# Patient Record
Sex: FEMALE | Race: WHITE | Employment: OTHER | URBAN - METROPOLITAN AREA
[De-identification: names, ages, dates, MRNs, and addresses within clinical notes are randomized per-mention and may not be internally consistent; named-entity substitution may affect disease eponyms.]

---

## 2022-11-16 ENCOUNTER — HOSPITAL ENCOUNTER (EMERGENCY)
Age: 27
Discharge: HOME | End: 2022-11-16
Attending: EMERGENCY MEDICINE
Payer: COMMERCIAL

## 2022-11-16 ENCOUNTER — APPOINTMENT (OUTPATIENT)
Dept: GENERAL RADIOLOGY | Age: 27
End: 2022-11-16
Attending: PHYSICIAN ASSISTANT
Payer: COMMERCIAL

## 2022-11-16 VITALS
WEIGHT: 171.52 LBS | HEIGHT: 68 IN | TEMPERATURE: 98.1 F | OXYGEN SATURATION: 100 % | BODY MASS INDEX: 25.99 KG/M2 | DIASTOLIC BLOOD PRESSURE: 82 MMHG | HEART RATE: 84 BPM | SYSTOLIC BLOOD PRESSURE: 113 MMHG | RESPIRATION RATE: 16 BRPM

## 2022-11-16 DIAGNOSIS — R07.89 ATYPICAL CHEST PAIN: Primary | ICD-10-CM

## 2022-11-16 DIAGNOSIS — R11.0 NAUSEA WITHOUT VOMITING: ICD-10-CM

## 2022-11-16 LAB
ALBUMIN SERPL-MCNC: 4.1 G/DL (ref 3.5–5)
ALBUMIN/GLOB SERPL: 1.1 {RATIO} (ref 1.1–2.2)
ALP SERPL-CCNC: 81 U/L (ref 45–117)
ALT SERPL-CCNC: 28 U/L (ref 12–78)
ANION GAP SERPL CALC-SCNC: 6 MMOL/L (ref 5–15)
APPEARANCE UR: ABNORMAL
AST SERPL-CCNC: 13 U/L (ref 15–37)
ATRIAL RATE: 82 BPM
BASOPHILS # BLD: 0 K/UL (ref 0–0.1)
BASOPHILS NFR BLD: 1 % (ref 0–1)
BILIRUB SERPL-MCNC: 0.3 MG/DL (ref 0.2–1)
BILIRUB UR QL: NEGATIVE
BUN SERPL-MCNC: 12 MG/DL (ref 6–20)
BUN/CREAT SERPL: 19 (ref 12–20)
CALCIUM SERPL-MCNC: 9.6 MG/DL (ref 8.5–10.1)
CALCULATED P AXIS, ECG09: 20 DEGREES
CALCULATED R AXIS, ECG10: 67 DEGREES
CALCULATED T AXIS, ECG11: 66 DEGREES
CHLORIDE SERPL-SCNC: 105 MMOL/L (ref 97–108)
CO2 SERPL-SCNC: 28 MMOL/L (ref 21–32)
COLOR UR: ABNORMAL
CREAT SERPL-MCNC: 0.63 MG/DL (ref 0.55–1.02)
DIAGNOSIS, 93000: NORMAL
DIFFERENTIAL METHOD BLD: ABNORMAL
EOSINOPHIL # BLD: 0 K/UL (ref 0–0.4)
EOSINOPHIL NFR BLD: 1 % (ref 0–7)
ERYTHROCYTE [DISTWIDTH] IN BLOOD BY AUTOMATED COUNT: 12.1 % (ref 11.5–14.5)
GLOBULIN SER CALC-MCNC: 3.7 G/DL (ref 2–4)
GLUCOSE SERPL-MCNC: 81 MG/DL (ref 65–100)
GLUCOSE UR STRIP.AUTO-MCNC: NEGATIVE MG/DL
HCG UR QL: NEGATIVE
HCT VFR BLD AUTO: 41.9 % (ref 35–47)
HGB BLD-MCNC: 14.1 G/DL (ref 11.5–16)
HGB UR QL STRIP: NEGATIVE
IMM GRANULOCYTES # BLD AUTO: 0 K/UL (ref 0–0.04)
IMM GRANULOCYTES NFR BLD AUTO: 0 % (ref 0–0.5)
KETONES UR QL STRIP.AUTO: NEGATIVE MG/DL
LEUKOCYTE ESTERASE UR QL STRIP.AUTO: NEGATIVE
LYMPHOCYTES # BLD: 1.4 K/UL (ref 0.8–3.5)
LYMPHOCYTES NFR BLD: 41 % (ref 12–49)
MCH RBC QN AUTO: 30.3 PG (ref 26–34)
MCHC RBC AUTO-ENTMCNC: 33.7 G/DL (ref 30–36.5)
MCV RBC AUTO: 89.9 FL (ref 80–99)
MONOCYTES # BLD: 0.2 K/UL (ref 0–1)
MONOCYTES NFR BLD: 5 % (ref 5–13)
NEUTS SEG # BLD: 1.8 K/UL (ref 1.8–8)
NEUTS SEG NFR BLD: 52 % (ref 32–75)
NITRITE UR QL STRIP.AUTO: NEGATIVE
NRBC # BLD: 0 K/UL (ref 0–0.01)
NRBC BLD-RTO: 0 PER 100 WBC
P-R INTERVAL, ECG05: 120 MS
PH UR STRIP: 6 [PH] (ref 5–8)
PLATELET # BLD AUTO: 187 K/UL (ref 150–400)
PMV BLD AUTO: 10 FL (ref 8.9–12.9)
POTASSIUM SERPL-SCNC: 4.3 MMOL/L (ref 3.5–5.1)
PROT SERPL-MCNC: 7.8 G/DL (ref 6.4–8.2)
PROT UR STRIP-MCNC: NEGATIVE MG/DL
Q-T INTERVAL, ECG07: 364 MS
QRS DURATION, ECG06: 82 MS
QTC CALCULATION (BEZET), ECG08: 425 MS
RBC # BLD AUTO: 4.66 M/UL (ref 3.8–5.2)
SODIUM SERPL-SCNC: 139 MMOL/L (ref 136–145)
SP GR UR REFRACTOMETRY: 1.01
TROPONIN-HIGH SENSITIVITY: <4 NG/L (ref 0–51)
UROBILINOGEN UR QL STRIP.AUTO: 0.2 EU/DL (ref 0.2–1)
VENTRICULAR RATE, ECG03: 82 BPM
WBC # BLD AUTO: 3.5 K/UL (ref 3.6–11)

## 2022-11-16 PROCEDURE — 80053 COMPREHEN METABOLIC PANEL: CPT

## 2022-11-16 PROCEDURE — 96374 THER/PROPH/DIAG INJ IV PUSH: CPT

## 2022-11-16 PROCEDURE — 81025 URINE PREGNANCY TEST: CPT

## 2022-11-16 PROCEDURE — 71046 X-RAY EXAM CHEST 2 VIEWS: CPT

## 2022-11-16 PROCEDURE — 36415 COLL VENOUS BLD VENIPUNCTURE: CPT

## 2022-11-16 PROCEDURE — 99285 EMERGENCY DEPT VISIT HI MDM: CPT

## 2022-11-16 PROCEDURE — 81003 URINALYSIS AUTO W/O SCOPE: CPT

## 2022-11-16 PROCEDURE — 93005 ELECTROCARDIOGRAM TRACING: CPT

## 2022-11-16 PROCEDURE — 74011250637 HC RX REV CODE- 250/637: Performed by: PHYSICIAN ASSISTANT

## 2022-11-16 PROCEDURE — 84484 ASSAY OF TROPONIN QUANT: CPT

## 2022-11-16 PROCEDURE — 74011250636 HC RX REV CODE- 250/636: Performed by: PHYSICIAN ASSISTANT

## 2022-11-16 PROCEDURE — 85025 COMPLETE CBC W/AUTO DIFF WBC: CPT

## 2022-11-16 PROCEDURE — 74011000250 HC RX REV CODE- 250: Performed by: PHYSICIAN ASSISTANT

## 2022-11-16 RX ORDER — NAPROXEN 375 MG/1
375 TABLET ORAL 2 TIMES DAILY WITH MEALS
Qty: 14 TABLET | Refills: 0 | Status: SHIPPED | OUTPATIENT
Start: 2022-11-16 | End: 2022-11-23

## 2022-11-16 RX ORDER — KETOROLAC TROMETHAMINE 30 MG/ML
30 INJECTION, SOLUTION INTRAMUSCULAR; INTRAVENOUS
Status: COMPLETED | OUTPATIENT
Start: 2022-11-16 | End: 2022-11-16

## 2022-11-16 RX ORDER — OMEPRAZOLE 40 MG/1
40 CAPSULE, DELAYED RELEASE ORAL DAILY
Qty: 14 CAPSULE | Refills: 0 | Status: SHIPPED | OUTPATIENT
Start: 2022-11-16 | End: 2022-11-30

## 2022-11-16 RX ORDER — ACETAMINOPHEN 325 MG/1
650 TABLET ORAL EVERY 6 HOURS
Qty: 20 TABLET | Refills: 0 | Status: SHIPPED | OUTPATIENT
Start: 2022-11-16 | End: 2022-11-21

## 2022-11-16 RX ORDER — MAG HYDROX/ALUMINUM HYD/SIMETH 200-200-20
30 SUSPENSION, ORAL (FINAL DOSE FORM) ORAL
Qty: 150 ML | Refills: 0 | Status: SHIPPED | OUTPATIENT
Start: 2022-11-16 | End: 2022-11-26

## 2022-11-16 RX ADMIN — KETOROLAC TROMETHAMINE 30 MG: 30 INJECTION, SOLUTION INTRAMUSCULAR at 09:53

## 2022-11-16 RX ADMIN — ALUMINUM HYDROXIDE AND MAGNESIUM HYDROXIDE 40 ML: 200; 200 SUSPENSION ORAL at 09:53

## 2022-11-16 NOTE — DISCHARGE INSTRUCTIONS
Follow-up with your PCP at the next available appointment for reevaluation of your symptoms. As we discussed, though we did not find any life-threatening cause of your chest pain, we are not certain of the cause. This may be caused to musculoskeletal pain for digestive issues such as reflux.   You should return to the emergency department sooner if you have any new concerning symptoms

## 2022-11-16 NOTE — ED PROVIDER NOTES
EMERGENCY DEPARTMENT HISTORY AND PHYSICAL EXAM      Date: 11/16/2022  Patient Name: Teena Kenyon    History of Presenting Illness     Chief Complaint   Patient presents with    Chest Pain     CP for over a week she says she went to an urgent care. Pt has been taking ibuprofen without relief. Pt cannot get in to see a cardiologist for over a month. History Provided By: Patient    HPI: Teena Kenyon, 32 y.o. female with no past medical history who presents the emergency department chest pain. She had a sudden onset of anterior chest pain which she describes as burning and pressure-like. It has been persistent and worsening since. It is worse at night when laying down, but otherwise no alleviating or ameliorating factors. She denies any migratory or radiating pain. She has had some nausea when eating, but otherwise no associated symptoms to include fevers, congestion, sore throat, dizziness, headaches, myalgias, cough, hemoptysis, shortness of breath, vomiting, abdominal pain, diarrhea, rash, urinary symptoms, abnormal vaginal discharge or bleeding, palpitations, lower extremities. She denies any past history of thromboembolic disease, recent surgery/immobilization/travel, estrogen use, hemoptysis, lower extremity redness/swelling/pain, active cancer. She denies any history of illicit drug use. No past family history of cardiovascular disease in the immediate family. There are no other complaints, changes, or physical findings at this time. PCP: None    No current facility-administered medications on file prior to encounter. No current outpatient medications on file prior to encounter. Past History     Past Medical History:  No past medical history on file. Past Surgical History:  No past surgical history on file. Family History:  No family history on file. Social History:        Allergies:  No Known Allergies      Review of Systems   Review of Systems   Constitutional: Negative for chills and fever. HENT:  Negative for congestion and sore throat. Respiratory:  Negative for cough and shortness of breath. Cardiovascular:  Positive for chest pain. Gastrointestinal:  Positive for nausea. Negative for abdominal pain, diarrhea and vomiting. Genitourinary:  Negative for dysuria and hematuria. Musculoskeletal:  Negative for myalgias. Skin:  Negative for rash. Neurological:  Negative for headaches. All other systems reviewed and are negative. Physical Exam   Physical Exam  Vitals and nursing note reviewed. Constitutional:       General: She is not in acute distress. Appearance: She is not toxic-appearing. HENT:      Head: Atraumatic. Right Ear: External ear normal.      Left Ear: External ear normal.      Nose: Nose normal.      Mouth/Throat:      Mouth: Mucous membranes are moist.   Eyes:      Extraocular Movements: Extraocular movements intact. Conjunctiva/sclera: Conjunctivae normal.   Cardiovascular:      Rate and Rhythm: Normal rate and regular rhythm. Pulses: Normal pulses. Heart sounds: Normal heart sounds. No murmur heard. No friction rub. No gallop. Comments: No murmurs rubs or gallops. Chest wall normal to inspection with no lesions erythema deformity or swelling. No reproducible tenderness or crepitus. Pulmonary:      Effort: Pulmonary effort is normal. No respiratory distress. Breath sounds: Normal breath sounds. No stridor. No wheezing, rhonchi or rales. Comments: Patient speaking in full sentences with no accessory muscle use, increased work of breathing ,or signs of respiratory distress. Good breath sounds to bases bilaterally. No wheezing, rhonchi, crackles or stridor. Abdominal:      General: Abdomen is flat. There is no distension. Palpations: Abdomen is soft. Tenderness: There is no abdominal tenderness. There is no guarding or rebound. Musculoskeletal:         General: No swelling. Cervical back: Neck supple. Skin:     General: Skin is warm. Neurological:      Mental Status: She is alert and oriented to person, place, and time. Psychiatric:         Mood and Affect: Mood normal.         Behavior: Behavior normal.         Thought Content: Thought content normal.         Judgment: Judgment normal.       Diagnostic Study Results     Labs -     Recent Results (from the past 12 hour(s))   EKG, 12 LEAD, INITIAL    Collection Time: 11/16/22  8:55 AM   Result Value Ref Range    Ventricular Rate 82 BPM    Atrial Rate 82 BPM    P-R Interval 120 ms    QRS Duration 82 ms    Q-T Interval 364 ms    QTC Calculation (Bezet) 425 ms    Calculated P Axis 20 degrees    Calculated R Axis 67 degrees    Calculated T Axis 66 degrees    Diagnosis       Normal sinus rhythm  Nonspecific ST abnormality  No previous ECGs available     CBC WITH AUTOMATED DIFF    Collection Time: 11/16/22  9:04 AM   Result Value Ref Range    WBC 3.5 (L) 3.6 - 11.0 K/uL    RBC 4.66 3.80 - 5.20 M/uL    HGB 14.1 11.5 - 16.0 g/dL    HCT 41.9 35.0 - 47.0 %    MCV 89.9 80.0 - 99.0 FL    MCH 30.3 26.0 - 34.0 PG    MCHC 33.7 30.0 - 36.5 g/dL    RDW 12.1 11.5 - 14.5 %    PLATELET 406 660 - 066 K/uL    MPV 10.0 8.9 - 12.9 FL    NRBC 0.0 0  WBC    ABSOLUTE NRBC 0.00 0.00 - 0.01 K/uL    NEUTROPHILS 52 32 - 75 %    LYMPHOCYTES 41 12 - 49 %    MONOCYTES 5 5 - 13 %    EOSINOPHILS 1 0 - 7 %    BASOPHILS 1 0 - 1 %    IMMATURE GRANULOCYTES 0 0.0 - 0.5 %    ABS. NEUTROPHILS 1.8 1.8 - 8.0 K/UL    ABS. LYMPHOCYTES 1.4 0.8 - 3.5 K/UL    ABS. MONOCYTES 0.2 0.0 - 1.0 K/UL    ABS. EOSINOPHILS 0.0 0.0 - 0.4 K/UL    ABS. BASOPHILS 0.0 0.0 - 0.1 K/UL    ABS. IMM.  GRANS. 0.0 0.00 - 0.04 K/UL    DF AUTOMATED     URINALYSIS W/ RFLX MICROSCOPIC    Collection Time: 11/16/22  9:04 AM   Result Value Ref Range    Color YELLOW/STRAW      Appearance CLOUDY (A) CLEAR      Specific gravity 1.010      pH (UA) 6.0 5.0 - 8.0      Protein Negative NEG mg/dL Glucose Negative NEG mg/dL    Ketone Negative NEG mg/dL    Bilirubin Negative NEG      Blood Negative NEG      Urobilinogen 0.2 0.2 - 1.0 EU/dL    Nitrites Negative NEG      Leukocyte Esterase Negative NEG     HCG URINE, QL. - POC    Collection Time: 11/16/22  9:09 AM   Result Value Ref Range    Pregnancy test,urine (POC) Negative NEG         Radiologic Studies -   XR CHEST PA LAT   Final Result      No acute abnormality. CT Results  (Last 48 hours)      None          CXR Results  (Last 48 hours)                 11/16/22 0919  XR CHEST PA LAT Final result    Impression:      No acute abnormality. Narrative:  EXAM: XR CHEST PA LAT       INDICATION: Chest pain       COMPARISON: None       TECHNIQUE: PA and lateral chest views       FINDINGS: The cardiac size is within normal limits. The pulmonary vasculature is   within normal limits. The lungs and pleural spaces are clear. Dextroconvex scoliosis of the thoracic   spine is noted. Medical Decision Making   I am the first provider for this patient. I reviewed the vital signs, available nursing notes, past medical history, past surgical history, family history and social history. Vital Signs-Reviewed the patient's vital signs. Patient Vitals for the past 12 hrs:   Temp Pulse Resp BP SpO2   11/16/22 0842 98.1 °F (36.7 °C) 84 16 113/82 100 %       Records Reviewed: Nursing Notes and Old Medical Records    Provider Notes (Medical Decision Making):   Patient evaluated for a 1 week history of persistent anterior chest pain. She is an otherwise healthy female with no risk factors for cardiopulmonary disease. EKG and troponin were unremarkable. She is a heart score of 0 = low risk for major adverse cardiovascular event in the next 30 days. Patient is PERC negative and therefore can be excluded for further evaluation for pulmonary embolism.   She has no features concerning otherwise for a life-threatening cause of chest pain, include pneumothorax, ACS, AAA, pneumonia or Boerhaave's. I do suspect the patient's symptoms are likely gastrointestinal related versus musculoskeletal.  She will be treated here acutely for both. She was advised of the diagnostic uncertainty of her evaluation and the need to follow-up with her PCP at the next available appointment. She was given return precautions to the ED. Patient expressed understanding agreement the discharge instructions and treatment plan. ED Course:   Initial assessment performed. The patients presenting problems have been discussed, and they are in agreement with the care plan formulated and outlined with them. I have encouraged them to ask questions as they arise throughout their visit. ED Course as of 11/16/22 0946   Wed Nov 16, 2022   0945 EKG interpretation: Sinus rate and rhythm 82 bpm.  No axis deviation. Intervals normal.  Nonspecific T wave changes in V1 and V2. No ischemic ST/T wave changes. [AJ]   [id number] Patient evaluated for a 1 week history of chest pain. She is PERC negative and therefore can be excluded from further evaluation for pulmonary embolism [AJ]      ED Course User Index  [AJ] BRUCE Rivero       Critical Care Time: None    Disposition:  discharged    PLAN:  1. There are no discharge medications for this patient. 2.   Follow-up Information    None       Return to ED if worse     Diagnosis     Clinical Impression: No diagnosis found. BRUCE Calvo (Electronic Signature)          Please note that this dictation was completed with [employer], the computer voice recognition software. Quite often unanticipated grammatical, syntax, homophones, and other interpretive errors are inadvertently transcribed by the computer software. Please disregards these errors. Please excuse any errors that have escaped final proofreading.

## 2022-11-16 NOTE — Clinical Note
[address]  [facility] EMERGENCY DEPT  [address] [place] [address]  Lucy Woodson [id number] [phone]    Work/School Note    Date: 11/16/2022    To Whom It May concern:      Salud Motta was seen and treated today in the emergency room by the following provider(s):  Attending Provider: Radha Finn MD  Physician Assistant: BRUCE Ramos. Salud Motta is excused from work/school on 11/16/22. She is clear to return to work/school on 11/17/22.         Sincerely,          BRUCE Hunter

## 2022-11-18 ENCOUNTER — OFFICE VISIT (OUTPATIENT)
Dept: FAMILY MEDICINE CLINIC | Age: 27
End: 2022-11-18
Payer: COMMERCIAL

## 2022-11-18 VITALS
HEIGHT: 68 IN | OXYGEN SATURATION: 100 % | TEMPERATURE: 97.4 F | WEIGHT: 170 LBS | DIASTOLIC BLOOD PRESSURE: 73 MMHG | RESPIRATION RATE: 16 BRPM | BODY MASS INDEX: 25.76 KG/M2 | HEART RATE: 76 BPM | SYSTOLIC BLOOD PRESSURE: 108 MMHG

## 2022-11-18 DIAGNOSIS — R07.9 CHEST PAIN, UNSPECIFIED TYPE: Primary | ICD-10-CM

## 2022-11-18 PROCEDURE — 99204 OFFICE O/P NEW MOD 45 MIN: CPT | Performed by: STUDENT IN AN ORGANIZED HEALTH CARE EDUCATION/TRAINING PROGRAM

## 2022-11-18 NOTE — PROGRESS NOTES
1. Have you been to the ER, urgent care clinic since your last visit? Hospitalized since your last visit? Yes When: 11/16/2022 Where: [facility] Reason for visit: Chest Pain    2. Have you seen or consulted any other health care providers outside of the [address] [place] [address] since your last visit? Include any pap smears or colon screening. No    OBGYN:  Dr. Tamara Moses MD  Phone: [phone]    Upcoming Cardiology appointment 12/12/2022    Chief Complaint   Patient presents with    New Patient           Establish Care           Chest Pain     Health Maintenance Due   Topic Date Due    Hepatitis C Screening  Never done    Depression Screen  Never done    COVID-19 Vaccine (1) Never done    DTaP/Tdap/Td series (1 - Tdap) Never done    Pap Smear  Never done    Flu Vaccine (1) 08/01/2022     3 most recent PHQ Screens 11/18/2022   Little interest or pleasure in doing things Not at all   Feeling down, depressed, irritable, or hopeless Not at all   Total Score PHQ 2 0     Abuse Screening Questionnaire 11/18/2022   Do you ever feel afraid of your partner? N   Are you in a relationship with someone who physically or mentally threatens you? N   Is it safe for you to go home?  Y   Visit Vitals  /73 (BP 1 Location: Left upper arm, BP Patient Position: Sitting, BP Cuff Size: Adult)   Pulse 76   Temp 97.4 °F (36.3 °C) (Skin)   Resp 16   Ht 5' 8\" (1.727 m)   Wt 170 lb (77.1 kg)   SpO2 100%   BMI 25.85 kg/m²

## 2022-11-18 NOTE — PROGRESS NOTES
Assessment/Plan:     Diagnoses and all orders for this visit:    1. Chest pain, unspecified type  -     REFERRAL TO CARDIOLOGY  -Chest pressure noted intermittently since June however has turned constant for the past 1.5 weeks.  -Evaluated in the ER 2 days ago with an EKG that noted normal sinus rhythm without any acute ischemic changes, a negative troponin, unremarkable lab work and a chest x-ray that showed no acute abnormality.  -Patient has been trialing NSAIDs, Tylenol and omeprazole without much benefit.  -Uncertain etiology however  want to rule out underlying cardiac etiology  -Patient referred to cardiology and has an appointment on 11/21 at 2:40 PM.  -ED precautions discussed. Follow-up and Dispositions    Return if symptoms worsen or fail to improve. Discussed expected course/resolution/complications of diagnosis in detail with patient. Medication risks/benefits/costs/interactions/alternatives discussed with patient. .   Pt expressed understanding with the diagnosis and plan    Subjective:      Beny Fong is a 32 y.o. female who presents for had concerns including New Patient (/), Establish Care (/), and Chest Pain. Current Outpatient Medications   Medication Sig Dispense Refill    omeprazole (PRILOSEC) 40 mg capsule Take 1 Capsule by mouth daily for 14 days. 14 Capsule 0    naproxen (NAPROSYN) 375 mg tablet Take 1 Tablet by mouth two (2) times daily (with meals) for 7 days. 14 Tablet 0    acetaminophen (TYLENOL) 325 mg tablet Take 2 Tablets by mouth every six (6) hours for 5 days. 20 Tablet 0    alum-mag hydroxide-simeth (MYLANTA) 200-200-20 mg/5 mL susp Take 30 mL by mouth every four (4) hours as needed for Heartburn for up to 10 days. 150 mL 0       No Known Allergies  History reviewed. No pertinent past medical history. No past surgical history on file. No family history on file.   Social History     Socioeconomic History    Marital status:      Spouse name: Not on file    Number of children: Not on file    Years of education: Not on file    Highest education level: Not on file   Occupational History    Not on file   Tobacco Use    Smoking status: Never    Smokeless tobacco: Never   Substance and Sexual Activity    Alcohol use: Not Currently    Drug use: Never    Sexual activity: Yes     Partners: Male     Comment:    Other Topics Concern    Not on file   Social History Narrative    Not on file     Social Determinants of Health     Financial Resource Strain: Not on file   Food Insecurity: Not on file   Transportation Needs: Not on file   Physical Activity: Inactive    Days of Exercise per Week: 0 days    Minutes of Exercise per Session: 0 min   Stress: Not on file   Social Connections: Not on file   Intimate Partner Violence: Not At Risk    Fear of Current or Ex-Partner: No    Emotionally Abused: No    Physically Abused: No    Sexually Abused: No   Housing Stability: Not on file       Patient is a 72-year-old female who presents the office today to establish care and with concerns with persistent chest pain. Patient states she has had intermittent chest pain since in June of this year however over the past 1.5 weeks she has noted a constant chest pain. She states the pain is a pressure sensation across her entire chest and is not changed based on position or activity. She did present to the ER on 11/16 where she had a work-up including EKG, chest x-ray, troponin, and lab work done. She also had been to [facility] prior to her ER visit. She has been taking naproxen, Tylenol and omeprazole. She has not noted any benefit at this time. She denies any heartburn type symptoms or tenderness to her chest wall on palpation. She did state that she has felt lightheaded associated with the chest pain previously but none now. She feels of the chest pain makes her anxious but states that anxiety did not start prior to the chest pain.   She denies any loss of consciousness or syncope. She does have an appointment with cardiology on 12/12 but would desire to be seen sooner. She denies any other acute symptoms or concerns today. ROS:   Review of Systems   Constitutional:  Negative for chills and fever. HENT:  Negative for congestion. Respiratory:  Negative for cough and shortness of breath. Cardiovascular:  Positive for chest pain. Negative for palpitations and leg swelling. Gastrointestinal:  Negative for abdominal pain, nausea and vomiting. Neurological:  Negative for dizziness, tingling, weakness and headaches. Objective:   Visit Vitals  /73 (BP 1 Location: Left upper arm, BP Patient Position: Sitting, BP Cuff Size: Adult)   Pulse 76   Temp 97.4 °F (36.3 °C) (Skin)   Resp 16   Ht 5' 8\" (1.727 m)   Wt 170 lb (77.1 kg)   LMP 11/12/2022   SpO2 100%   BMI 25.85 kg/m²         Vitals and Nurse Documentation reviewed. Physical Exam  Constitutional:       General: She is not in acute distress. Appearance: Normal appearance. She is not toxic-appearing. HENT:      Head: Normocephalic and atraumatic. Eyes:      Conjunctiva/sclera: Conjunctivae normal.   Cardiovascular:      Rate and Rhythm: Normal rate and regular rhythm. Pulses: Normal pulses. Heart sounds: Normal heart sounds. No murmur heard. No gallop. Comments: No chest wall tenderness to palpation  Pulmonary:      Effort: Pulmonary effort is normal. No respiratory distress. Breath sounds: Normal breath sounds. No wheezing or rhonchi. Abdominal:      General: Bowel sounds are normal. There is no distension. Palpations: Abdomen is soft. Tenderness: There is no abdominal tenderness. There is no guarding. Musculoskeletal:      Cervical back: Neck supple. Right lower leg: No edema. Left lower leg: No edema. Neurological:      Mental Status: She is alert and oriented to person, place, and time.       Gait: Gait normal.       Results for orders placed or performed during the hospital encounter of 11/16/22   CBC WITH AUTOMATED DIFF   Result Value Ref Range    WBC 3.5 (L) 3.6 - 11.0 K/uL    RBC 4.66 3.80 - 5.20 M/uL    HGB 14.1 11.5 - 16.0 g/dL    HCT 41.9 35.0 - 47.0 %    MCV 89.9 80.0 - 99.0 FL    MCH 30.3 26.0 - 34.0 PG    MCHC 33.7 30.0 - 36.5 g/dL    RDW 12.1 11.5 - 14.5 %    PLATELET 603 176 - 165 K/uL    MPV 10.0 8.9 - 12.9 FL    NRBC 0.0 0  WBC    ABSOLUTE NRBC 0.00 0.00 - 0.01 K/uL    NEUTROPHILS 52 32 - 75 %    LYMPHOCYTES 41 12 - 49 %    MONOCYTES 5 5 - 13 %    EOSINOPHILS 1 0 - 7 %    BASOPHILS 1 0 - 1 %    IMMATURE GRANULOCYTES 0 0.0 - 0.5 %    ABS. NEUTROPHILS 1.8 1.8 - 8.0 K/UL    ABS. LYMPHOCYTES 1.4 0.8 - 3.5 K/UL    ABS. MONOCYTES 0.2 0.0 - 1.0 K/UL    ABS. EOSINOPHILS 0.0 0.0 - 0.4 K/UL    ABS. BASOPHILS 0.0 0.0 - 0.1 K/UL    ABS. IMM. GRANS. 0.0 0.00 - 0.04 K/UL    DF AUTOMATED     METABOLIC PANEL, COMPREHENSIVE   Result Value Ref Range    Sodium 139 136 - 145 mmol/L    Potassium 4.3 3.5 - 5.1 mmol/L    Chloride 105 97 - 108 mmol/L    CO2 28 21 - 32 mmol/L    Anion gap 6 5 - 15 mmol/L    Glucose 81 65 - 100 mg/dL    BUN 12 6 - 20 MG/DL    Creatinine 0.63 0.55 - 1.02 MG/DL    BUN/Creatinine ratio 19 12 - 20      eGFR >60 >60 ml/min/1.73m2    Calcium 9.6 8.5 - 10.1 MG/DL    Bilirubin, total 0.3 0.2 - 1.0 MG/DL    ALT (SGPT) 28 12 - 78 U/L    AST (SGOT) 13 (L) 15 - 37 U/L    Alk.  phosphatase 81 45 - 117 U/L    Protein, total 7.8 6.4 - 8.2 g/dL    Albumin 4.1 3.5 - 5.0 g/dL    Globulin 3.7 2.0 - 4.0 g/dL    A-G Ratio 1.1 1.1 - 2.2     TROPONIN-HIGH SENSITIVITY   Result Value Ref Range    Troponin-High Sensitivity <4 0 - 51 ng/L   URINALYSIS W/ RFLX MICROSCOPIC   Result Value Ref Range    Color YELLOW/STRAW      Appearance CLOUDY (A) CLEAR      Specific gravity 1.010      pH (UA) 6.0 5.0 - 8.0      Protein Negative NEG mg/dL    Glucose Negative NEG mg/dL    Ketone Negative NEG mg/dL    Bilirubin Negative NEG      Blood Negative NEG Urobilinogen 0.2 0.2 - 1.0 EU/dL    Nitrites Negative NEG      Leukocyte Esterase Negative NEG     HCG URINE, QL. - POC   Result Value Ref Range    Pregnancy test,urine (POC) Negative NEG     EKG, 12 LEAD, INITIAL   Result Value Ref Range    Ventricular Rate 82 BPM    Atrial Rate 82 BPM    P-R Interval 120 ms    QRS Duration 82 ms    Q-T Interval 364 ms    QTC Calculation (Bezet) 425 ms    Calculated P Axis 20 degrees    Calculated R Axis 67 degrees    Calculated T Axis 66 degrees    Diagnosis       Normal sinus rhythm  Nonspecific ST abnormality  No previous ECGs available  Confirmed by Justin Fry ([id number]) on 11/16/2022 11:45:15 AM

## 2022-11-21 ENCOUNTER — OFFICE VISIT (OUTPATIENT)
Dept: CARDIOLOGY CLINIC | Age: 27
End: 2022-11-21
Payer: COMMERCIAL

## 2022-11-21 VITALS
OXYGEN SATURATION: 95 % | HEART RATE: 82 BPM | WEIGHT: 174.4 LBS | DIASTOLIC BLOOD PRESSURE: 72 MMHG | HEIGHT: 68 IN | SYSTOLIC BLOOD PRESSURE: 110 MMHG | BODY MASS INDEX: 26.43 KG/M2

## 2022-11-21 DIAGNOSIS — R07.89 OTHER CHEST PAIN: Primary | ICD-10-CM

## 2022-11-21 DIAGNOSIS — R06.00 DYSPNEA, UNSPECIFIED TYPE: ICD-10-CM

## 2022-11-21 PROCEDURE — 99204 OFFICE O/P NEW MOD 45 MIN: CPT | Performed by: INTERNAL MEDICINE

## 2022-11-21 NOTE — PROGRESS NOTES
Lokesh Vitale MD., [facility] [place]    [address] [facility] [place], [address] [facility]    Office [phone],ESK [phone]           Ayush Mcnulty is a 32 y.o. female referred for evaluation of chest pain. Consult requested by Yesenia Osborn MD    CC - as documented in EMR    Dear Dr. Yesenia Osborn MD    I had the pleasure of seeing Ms./Mr.Kendra Shikha Dewitt in the office today. Assessment:   Atypical chest pain  Occasional dyspnea      Plan:    Regular stress test  Echocardiogram  Advised gentle stretching of chest wall/rib cage see if it will help with the pain. If cardiac work-up is negative she will follow-up with me in apparent basis. Patient understands the plan. All questions were answered to the patient's satisfaction. I appreciate the opportunity to be involved in Ms. Brown. See note below for details. Please do not hesitate to contact us   with questions or concerns. Lokesh Vitale MD      Cardiac Testing/ Procedures: A. Cardiac Cath/PCI:    B.ECHO/BHARTI:    C.StressNuclear/Stress ECHO/Stress test:    D.Vascular:    E. EP:    F. Miscellaneous:    History:     Ayush Mcnulty is a 32 y.o. female who is referred for evaluation of chest pain. History of retrosternal chest tightness for the past few days. She went to urgent care and was treated with anti-inflammatories for possible costochondritis. Since pain persisted, she went to the emergency room 11/16/2022. Work-up including high-sensitivity troponin was negative. She was advised to follow-up with cardiology if symptoms persisted. Mild dyspnea with chest tightness. Chest tightness is constant. No aggravating or relieving factors. No history of trauma. No history of recent URI. No family history of SCD. Past Medical/Surgical and Family/Social History:     History reviewed. No pertinent past medical history. History reviewed. No pertinent surgical history.   Family History   Problem Relation Age of Onset    Heart Disease Neg Hx       Social History     Tobacco Use    Smoking status: Never    Smokeless tobacco: Never   Substance Use Topics    Alcohol use: Not Currently    Drug use: Never            Medications:       Current Outpatient Medications   Medication Sig Dispense    omeprazole (PRILOSEC) 40 mg capsule Take 1 Capsule by mouth daily for 14 days. 14 Capsule    naproxen (NAPROSYN) 375 mg tablet Take 1 Tablet by mouth two (2) times daily (with meals) for 7 days. 14 Tablet    alum-mag hydroxide-simeth (MYLANTA) 200-200-20 mg/5 mL susp Take 30 mL by mouth every four (4) hours as needed for Heartburn for up to 10 days. 150 mL    acetaminophen (TYLENOL) 325 mg tablet Take 2 Tablets by mouth every six (6) hours for 5 days. (Patient not taking: Reported on 11/21/2022) 20 Tablet     No current facility-administered medications for this visit. 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 No thrills.    Abd:  Soft, non-tender, non-distended,BS+,   MSK: No cyanosis  Skin: No rashes    Neuro: moving all four extremities , follows commands appropriately  Psych:  Good insight, oriented to person, place , alert, Nml Affect  LE: No edema    EKG: Personally interpreted 11/16/2022  Sinus rhythm, nonspecific ST-T changes      Medication Side Effects and Warnings were discussed with patient: yes  Patient Labs were reviewed and/or requested:  yes  Patient Past Records were reviewed and/or requested: yes    Total time:       mins    ATTENTION:   This medical record was transcribed using an electronic medical records/speech recognition system. Although proofread, it may and can contain electronic, spelling and other errors. Corrections may be executed at a later time. Please feel free to contact us for any clarifications as needed.       Chirag Guajardo MD

## 2022-11-21 NOTE — PROGRESS NOTES
Tee Sermon was placed in room B8    Chief Complaint   Patient presents with    New Patient    Vaishnavi Coelho Follow Up       Patient presents today as a new patient has been having intermittent chest pressure/pain for the last 2 weeks.      Visit Vitals  /72 (BP 1 Location: Left upper arm, BP Patient Position: Sitting)   Pulse 82   Ht 5' 8\" (1.727 m)   Wt 174 lb 6.4 oz (79.1 kg)   SpO2 95%   BMI 26.52 kg/m²          SOB No     Dizziness NO     Swelling NO